# Patient Record
Sex: MALE | Race: BLACK OR AFRICAN AMERICAN | NOT HISPANIC OR LATINO | URBAN - METROPOLITAN AREA
[De-identification: names, ages, dates, MRNs, and addresses within clinical notes are randomized per-mention and may not be internally consistent; named-entity substitution may affect disease eponyms.]

---

## 2022-08-24 ENCOUNTER — OFFICE (OUTPATIENT)
Dept: URBAN - METROPOLITAN AREA CLINIC 34 | Facility: CLINIC | Age: 42
End: 2022-08-24

## 2022-08-24 VITALS
TEMPERATURE: 97.7 F | DIASTOLIC BLOOD PRESSURE: 109 MMHG | HEIGHT: 69 IN | SYSTOLIC BLOOD PRESSURE: 141 MMHG | WEIGHT: 278 LBS | HEART RATE: 71 BPM

## 2022-08-24 DIAGNOSIS — K76.89 OTHER SPECIFIED DISEASES OF LIVER: ICD-10-CM

## 2022-08-24 DIAGNOSIS — D18.03 HEMANGIOMA OF INTRA-ABDOMINAL STRUCTURES: ICD-10-CM

## 2022-08-24 DIAGNOSIS — K64.9 UNSPECIFIED HEMORRHOIDS: ICD-10-CM

## 2022-08-24 PROCEDURE — 99204 OFFICE O/P NEW MOD 45 MIN: CPT | Performed by: PHYSICIAN ASSISTANT

## 2022-08-24 NOTE — SERVICEHPINOTES
OC FROST   is a   41   year old  male (retired from [employer]) who is here     for consult about liver hemangioma and hemorrhoids. He retired from /[employer] last year in 2020 after 22 years of service. He has h/o cavernous hemangioma in liver dx in 2014  Last imaging in 2019 or 2020 (no records available). Most recent lab work from March 2022 [facility] (no records at this time). He also complaints of burning/swelling sensation to rectum, which he attributes to h/o hemorrhoids noticed every few weeks. Prior use of pre H creams prn. No blood in stool or BRBPR. He has BMS qod, BSS 3-4 with mild straining needed to achieve a BM: No pain with defecation or tenesmus.  No trial of metamucil or miralax. Last colonoscopy in 2019 [facility] "normal" per patient report (requesting records). Denies chest pain, n/v, abdominal pain, change in BMs, melena, BRBPR, weight loss. br
br